# Patient Record
Sex: FEMALE | URBAN - METROPOLITAN AREA
[De-identification: names, ages, dates, MRNs, and addresses within clinical notes are randomized per-mention and may not be internally consistent; named-entity substitution may affect disease eponyms.]

---

## 2024-07-10 ENCOUNTER — OFFICE (OUTPATIENT)
Dept: URBAN - METROPOLITAN AREA CLINIC 11 | Facility: CLINIC | Age: 54
End: 2024-07-10

## 2024-07-10 VITALS
OXYGEN SATURATION: 97 % | HEART RATE: 77 BPM | SYSTOLIC BLOOD PRESSURE: 121 MMHG | WEIGHT: 182 LBS | DIASTOLIC BLOOD PRESSURE: 76 MMHG | HEIGHT: 62 IN

## 2024-07-10 DIAGNOSIS — R10.13 EPIGASTRIC PAIN: ICD-10-CM

## 2024-07-10 DIAGNOSIS — K92.1 MELENA: ICD-10-CM

## 2024-07-10 DIAGNOSIS — K59.00 CONSTIPATION, UNSPECIFIED: ICD-10-CM

## 2024-07-10 PROCEDURE — 99204 OFFICE O/P NEW MOD 45 MIN: CPT

## 2024-07-10 RX ORDER — SODIUM PICOSULFATE, MAGNESIUM OXIDE, AND ANHYDROUS CITRIC ACID 12; 3.5; 1 G/175ML; G/175ML; MG/175ML
LIQUID ORAL
Qty: 1 | Refills: 0 | Status: COMPLETED
Start: 2024-07-10 | End: 2024-08-26

## 2024-07-10 NOTE — SERVICENOTES
Attending Attestation:  Patient seen and examined independently by me, Dr. John Sotelo.  Prior records reviewed and case discussed with BRIAN Gudino after which impression and plan were developed.

## 2024-07-10 NOTE — SERVICEHPINOTES
Ms. Gomez presents today with a PCP referral for rectal bleeding and evaluation for colonoscopy. Per MD note, patient has never had a colonoscopy. She reports hemorrhoidal bleeding that has been worse than she has ever experienced. Per labs sent from primary care patient has elevations in lipid panel.  Ptkat presents today reporting he is having rectal bleeding for what she believes is from her hemorrhoids.  She reports she has had hemorrhoids since her a pregnancy about 35 years ago.  Patient states she does feel one protrusion but believes that most of the hemorrhoids are internal.  Patient reports having BRB with every bowel movement lately in the water and on the tissue. She can have up to 4 or 5 bowel movements a day.  She states sometimes she may go once or twice a day.  She reports they normally are hard and required straining.  On days that she has hard stools, she does wear a pad to prevent bleeding.  Patient was prescribed some rectal cream her PCP but was unsure what.  She states it has not been helping much.  She states what helps most with the bleeding is using lots of Vaseline.  She has tried over-the-counter hemorrhoid medication that did not help.  Patient is taking Senokot and docusate daily.  She denies using any MiraLax, fiber supplements, and states she drinks only about 2 bottles of water a day.  Patient states she did have a colonoscopy probably 25 years ago in [facility].  She has uncertain of the findings.  
abhijeet jha Patient reports she does have some occasional acid reflux flare ups where she may have to take over-the-counter medications.  She states it happens once a month or less.  She feels that her GERD is well-controlled on omeprazole.  Patient does report however that sometimes it feels like foods are getting caught up.  If she does not completely chew everything it will get stuck in her chest.  She states she has to just hold her chest and wait and it will eventually go down.  She states that it has happened with water as well but not often.   She states that she had an EGD in [facility] probably 30 years ago but the hospital has now closed. 
abhijeet jha Patient reports for the last 6 months she has had intermittent dull right upper quadrant pain.  She states that the pain happens probably 2 to 3 times a week now and it is associated when she eats a lot.  The pain will eventually resolve after 15 20 minutes.  Nothing makes it better or worse.  The pain does not radiate anywhere else.
abhijeet jha   Pt is not on any blood thinner nor is followed by cardiologist.  She is on wegovy and will have to hold medication one week prior to colonoscopy. 
abhijeet jha MD Note:    Patient reports several months of abdominal pain extending from the epigastric region just below the xiphoid process to the left upper quadrant in the midclavicular line.  Pain is aggravated by larger meals.  Pain has not changed over the last several months and is dull in character.  She has only been on Wegovy for two weeks without any exacerbation of her pain.  No nausea or vomiting.  She takes BC powders and NSAID almost daily.  She is compliant with proton pump inhibitor therapy.  No dysphagia or melena.  No abdominal trauma.  She also has chronic constipation with very infrequent spontaneous stools.  She is now taking a stool softener and various over-the-counter laxatives and is having 2-3 loose stools daily at this point.  There are times when she has 5-6 loose stools daily.  She has frequent small volume hematochezia with bright red blood with an almost all of her stools.

## 2024-08-26 PROBLEM — K63.5 POLYP OF COLON: Status: ACTIVE | Noted: 2024-08-26

## 2024-08-26 PROBLEM — K57.30 DIVERTICULOSIS OF LARGE INTESTINE WITHOUT PERFORATION OR ABS: Status: ACTIVE | Noted: 2024-08-26

## 2024-08-26 PROBLEM — K92.2 EVALUATION OF UNEXPLAINED GI BLEEDING: Status: ACTIVE | Noted: 2024-08-26
